# Patient Record
Sex: FEMALE | Race: OTHER | HISPANIC OR LATINO | URBAN - METROPOLITAN AREA
[De-identification: names, ages, dates, MRNs, and addresses within clinical notes are randomized per-mention and may not be internally consistent; named-entity substitution may affect disease eponyms.]

---

## 2017-07-23 ENCOUNTER — EMERGENCY (EMERGENCY)
Facility: HOSPITAL | Age: 57
LOS: 1 days | Discharge: HOME | End: 2017-07-23
Attending: EMERGENCY MEDICINE | Admitting: EMERGENCY MEDICINE
Payer: MEDICAID

## 2017-07-23 VITALS
HEART RATE: 95 BPM | SYSTOLIC BLOOD PRESSURE: 170 MMHG | RESPIRATION RATE: 20 BRPM | OXYGEN SATURATION: 100 % | DIASTOLIC BLOOD PRESSURE: 109 MMHG | TEMPERATURE: 98 F

## 2017-07-23 LAB
ALBUMIN SERPL ELPH-MCNC: 4.1 G/DL — SIGNIFICANT CHANGE UP (ref 3.3–5)
ALP SERPL-CCNC: 70 U/L — SIGNIFICANT CHANGE UP (ref 40–120)
ALT FLD-CCNC: 84 U/L RC — HIGH (ref 10–45)
ANION GAP SERPL CALC-SCNC: 15 MMOL/L — SIGNIFICANT CHANGE UP (ref 5–17)
AST SERPL-CCNC: 51 U/L — HIGH (ref 10–40)
BASOPHILS # BLD AUTO: 0.1 K/UL — SIGNIFICANT CHANGE UP (ref 0–0.2)
BASOPHILS NFR BLD AUTO: 1 % — SIGNIFICANT CHANGE UP (ref 0–2)
BILIRUB SERPL-MCNC: 0.2 MG/DL — SIGNIFICANT CHANGE UP (ref 0.2–1.2)
BUN SERPL-MCNC: 16 MG/DL — SIGNIFICANT CHANGE UP (ref 7–23)
CALCIUM SERPL-MCNC: 9.2 MG/DL — SIGNIFICANT CHANGE UP (ref 8.4–10.5)
CHLORIDE SERPL-SCNC: 103 MMOL/L — SIGNIFICANT CHANGE UP (ref 96–108)
CK MB CFR SERPL CALC: 2.4 NG/ML — SIGNIFICANT CHANGE UP (ref 0–3.8)
CK SERPL-CCNC: 96 U/L — SIGNIFICANT CHANGE UP (ref 25–170)
CO2 SERPL-SCNC: 23 MMOL/L — SIGNIFICANT CHANGE UP (ref 22–31)
CREAT SERPL-MCNC: 0.77 MG/DL — SIGNIFICANT CHANGE UP (ref 0.5–1.3)
EOSINOPHIL # BLD AUTO: 0.2 K/UL — SIGNIFICANT CHANGE UP (ref 0–0.5)
EOSINOPHIL NFR BLD AUTO: 3.2 % — SIGNIFICANT CHANGE UP (ref 0–6)
GAS PNL BLDV: SIGNIFICANT CHANGE UP
GLUCOSE SERPL-MCNC: 159 MG/DL — HIGH (ref 70–99)
HCT VFR BLD CALC: 41.2 % — SIGNIFICANT CHANGE UP (ref 34.5–45)
HGB BLD-MCNC: 14.1 G/DL — SIGNIFICANT CHANGE UP (ref 11.5–15.5)
LYMPHOCYTES # BLD AUTO: 2.4 K/UL — SIGNIFICANT CHANGE UP (ref 1–3.3)
LYMPHOCYTES # BLD AUTO: 40.4 % — SIGNIFICANT CHANGE UP (ref 13–44)
MCHC RBC-ENTMCNC: 30.6 PG — SIGNIFICANT CHANGE UP (ref 27–34)
MCHC RBC-ENTMCNC: 34.2 GM/DL — SIGNIFICANT CHANGE UP (ref 32–36)
MCV RBC AUTO: 89.5 FL — SIGNIFICANT CHANGE UP (ref 80–100)
MONOCYTES # BLD AUTO: 0.5 K/UL — SIGNIFICANT CHANGE UP (ref 0–0.9)
MONOCYTES NFR BLD AUTO: 8.5 % — SIGNIFICANT CHANGE UP (ref 2–14)
NEUTROPHILS # BLD AUTO: 2.8 K/UL — SIGNIFICANT CHANGE UP (ref 1.8–7.4)
NEUTROPHILS NFR BLD AUTO: 46.8 % — SIGNIFICANT CHANGE UP (ref 43–77)
PLATELET # BLD AUTO: 156 K/UL — SIGNIFICANT CHANGE UP (ref 150–400)
POTASSIUM SERPL-MCNC: 4.3 MMOL/L — SIGNIFICANT CHANGE UP (ref 3.5–5.3)
POTASSIUM SERPL-SCNC: 4.3 MMOL/L — SIGNIFICANT CHANGE UP (ref 3.5–5.3)
PROT SERPL-MCNC: 7.4 G/DL — SIGNIFICANT CHANGE UP (ref 6–8.3)
RBC # BLD: 4.61 M/UL — SIGNIFICANT CHANGE UP (ref 3.8–5.2)
RBC # FLD: 12.2 % — SIGNIFICANT CHANGE UP (ref 10.3–14.5)
SODIUM SERPL-SCNC: 141 MMOL/L — SIGNIFICANT CHANGE UP (ref 135–145)
TROPONIN T SERPL-MCNC: <0.01 NG/ML — SIGNIFICANT CHANGE UP (ref 0–0.06)
WBC # BLD: 5.9 K/UL — SIGNIFICANT CHANGE UP (ref 3.8–10.5)
WBC # FLD AUTO: 5.9 K/UL — SIGNIFICANT CHANGE UP (ref 3.8–10.5)

## 2017-07-23 PROCEDURE — 99220: CPT | Mod: 25

## 2017-07-23 PROCEDURE — 73090 X-RAY EXAM OF FOREARM: CPT | Mod: 26,LT

## 2017-07-23 PROCEDURE — 73110 X-RAY EXAM OF WRIST: CPT | Mod: 26,LT

## 2017-07-23 PROCEDURE — 93010 ELECTROCARDIOGRAM REPORT: CPT

## 2017-07-23 PROCEDURE — 71010: CPT | Mod: 26

## 2017-07-23 PROCEDURE — 73502 X-RAY EXAM HIP UNI 2-3 VIEWS: CPT | Mod: 26,RT

## 2017-07-23 RX ORDER — SODIUM CHLORIDE 9 MG/ML
3 INJECTION INTRAMUSCULAR; INTRAVENOUS; SUBCUTANEOUS EVERY 8 HOURS
Qty: 0 | Refills: 0 | Status: DISCONTINUED | OUTPATIENT
Start: 2017-07-23 | End: 2017-07-27

## 2017-07-23 RX ORDER — MORPHINE SULFATE 50 MG/1
4 CAPSULE, EXTENDED RELEASE ORAL ONCE
Qty: 0 | Refills: 0 | Status: DISCONTINUED | OUTPATIENT
Start: 2017-07-23 | End: 2017-07-23

## 2017-07-23 RX ADMIN — MORPHINE SULFATE 4 MILLIGRAM(S): 50 CAPSULE, EXTENDED RELEASE ORAL at 21:06

## 2017-07-23 RX ADMIN — MORPHINE SULFATE 4 MILLIGRAM(S): 50 CAPSULE, EXTENDED RELEASE ORAL at 22:01

## 2017-07-23 NOTE — ED PROVIDER NOTE - OBJECTIVE STATEMENT
57F PMH hypothyroidism p/w syncope. Was previously well, walking down street when she suddenly became lightheaded and collapsed. She was able to lower herself to her buttocks. No head trauma, no LOC, no postictal state. No diaphoresis, CP, SOB, N/V. C/o L wrist, L shoulder, R hip, L back pain. No cardiac history.

## 2017-07-23 NOTE — ED CDU PROVIDER NOTE - OBJECTIVE STATEMENT
57F PMH hypothyroidism p/w syncope. Was previously well, walking down street when she suddenly became lightheaded and collapsed. She was able to lower herself to her buttocks. No head trauma,  no postictal state. No diaphoresis, CP, SOB, N/V. C/o L wrist, L shoulder, R hip, L back pain. No cardiac history.    In the ED VSS.  trop negative x 1.  EKG NSR.  Patient denies CP/SOB, head trauma.  Only c/o left wrist pain and back pain.

## 2017-07-23 NOTE — ED ADULT NURSE NOTE - OBJECTIVE STATEMENT
56 y/o female presenting to the ED complaining of left arm pain s/p syncope; Patient states was walking on sidewalk when had a witnessed LOC; Per family patient lowered herself to the ground; No head trauma; Patient fell on left arm; Positive deformity noted to left wrist; Patient able to move fingers; positive sensation and pulses; Patient denies chest pain, dizziness, fevers, n/v/d, SOB; Pain in left arm noted to patient; a&ox3; safety and comfort measures proved; family at bedside; bed placed in lowest position for safety

## 2017-07-23 NOTE — ED CDU PROVIDER NOTE - PLAN OF CARE
1.  Stay Hydrated  2. Continue taking all current home medications  3.  Please follow up with your Primary care provider in 1-2 (Please bring all of your results with you)  4.  Return to the ER for worsening Chest Pain, Shortness of breath or any other concerning symptoms. 1.  Stay Hydrated  2. Continue taking all current home medications  3.  Please follow up with your Primary care provider in 1-2 (Please bring all of your results with you)       Follow up with orthopedics as instructed  4.  Return to the ER for worsening Chest Pain, Shortness of breath or any other concerning symptoms. 1.  Stay Hydrated  2. Continue taking all current home medications  3.  Please follow up with your Primary care provider in 1-2 (Please bring all of your results with you)       Follow up with orthopedics as instructed - Dr. Casillas [phone].  4.  Return to the ER for worsening Chest Pain, Shortness of breath or any other concerning symptoms. 1. Stay hydrated  2. Continue taking all current home medications  3. Please follow up with your Primary care provider in 1-2. You will need further evaluation of thyroid function tests. (Please bring all of your results with you).    Follow up with orthopedic as instructed - Dr. Casillas at [phone]. Call to make an appointment this week.   4.  Return to the ER for worsening chest pain, shortness of breath, worsening pain, numbness or tingling in hand/fingers, or any other concerning symptoms. 1. Stay hydrated  2. Continue taking all current home medications. Because you ran out of your Synthroid- will give you prescription for 1 week, but please have followed up with your Doctor. Take Ibuprofen 600mg every 6hrs for pain as needed with food. Take Percocet 1 tab every 6hrs for severe pain as needed - don't drive after, makes drowsy.   3. Please follow up with your Primary care provider in 1-2. You will need further evaluation of thyroid function tests and you will need to be sent for a stress test by your Doctor or by Cardiology clinic [phone], follow up in 1-2 days. (Please bring all of your results with you).    Follow up with orthopedic as instructed - Dr. Casillas at [phone], in 3-5 days. Call to make an appointment this week.   4.  Return to the ER for worsening chest pain, shortness of breath, worsening pain, numbness or tingling in hand/fingers, or any other concerning symptoms.

## 2017-07-23 NOTE — ED PROVIDER NOTE - ATTENDING CONTRIBUTION TO CARE
Dr. Joy Note: pt with exertional syncope.  Also with L forearm contusion after fall and R posterior lower back/hip sprain.  Last stress 2yrs ago.

## 2017-07-23 NOTE — ED CDU PROVIDER NOTE - ATTENDING CONTRIBUTION TO CARE
I have personally performed a face to face diagnostic evaluation on this patient.  I have reviewed the ACP note and agree with the history, exam, and plan of care, except as noted.  History and Exam by me shows as dictated in ED Provider Note.

## 2017-07-23 NOTE — ED PROVIDER NOTE - MEDICAL DECISION MAKING DETAILS
57F PMH hypothyroidism p/w exertional syncope. No cardiac history. Xrays of wrist 57F PMH hypothyroidism p/w exertional syncope. No cardiac history. Xrays of wrist, forearm, shoulder, pelvis. Check basic labs. EKG WNL except borderline prolonged QTc. Monitor on tele. Given age and exertional syncope will check stress echo. CDU.

## 2017-07-23 NOTE — ED ADULT TRIAGE NOTE - CHIEF COMPLAINT QUOTE
syncope.    Pt was dizzy and fell.  Fall was witnessed.    Pt complaining of left arm pain and back pain.   Pt did not hit head.

## 2017-07-23 NOTE — ED ADULT NURSE NOTE - ED STAT RN HANDOFF DETAILS
Report given to CDU FRANKY ANDINO. Pt awaiting X-ray, X-ray reads and then moving to CDU as per SYDNEY Lopez CDU FRANKY ANIDNO made aware in verbal report. Pt on portable CCM with NSR HR 72. Pt and family aware of plan of care. Safety maintained. Family at bedside. Report given to CDU FRANKY ANDINO. Pt awaiting X-ray, X-ray reads and then moving to CDU as per SYDNEY Lopez, CDU FRANKY ANDINO made aware in verbal report. Pt on portable CCM with NSR HR 72. Pt and family aware of plan of care. Safety maintained. Family at bedside.    0000: Splint in place to L forearm, Pt has pos and equal sensation to upper extremities bilat, pt able to wiggle L fingers, strong peripheral pulses x 4, no numbness/tingling to L hand/fingers. CDU FRANKY ANDINO made aware that splint is in place, EDT taking pt to CDU bed 7 in wheelchair, family at bedside.

## 2017-07-23 NOTE — ED CDU PROVIDER NOTE - PHYSICAL EXAMINATION
MSK: left wrist TTP, no snuff box ttp.  right lumbosacral paraspinal TTP and right hi TTP.  FROM in all extremities.  Compartments soft

## 2017-07-23 NOTE — ED CDU PROVIDER NOTE - PROGRESS NOTE DETAILS
Patient seen at bedside in NAD.  VSS.  Patient resting comfortably without complaints. Patient seen by ortho at bedside.  Placed in a long arm sling. -John Garibay PA-C Patient seen at bedside in NAD.  VSS.  Patient resting comfortably without complaints. -John Garibay PA-C Patient seen and evaluated at bedside. Resting comfortably, NAD. C/o L wrist pain. Denies CP, palpitations, SOB. VSS. Pt in L arm splint to f/u with ortho as outpatient. Awaiting stress test. Informed patient of elevated TSH and recommended f/u with PCP. Will send T3 and T4. - Camelia Vazquez PA-C Patient resting in bed comfortably. NAD.  No complaints. Vital Signs Stable. No events on telemetry monitor.  -Camelia Vazquez PA-C CDU NOTE SYDNEY Doyle: pt resting comfortably, feels well without complaint. NAD VSS. no events on tele. L arm in splint. pt had Echo portion of testing. pt unable to get stress portion due to fracture. as per discussion with patient, pt does not want to stay til tomorrow for stress test and wants to go home. pt reports that she will f/up with her PCP for stress test. pt never had CP, sob/dyspnea, weakness, jaw pain, diaphoresis, paresthesias. discussed with Dr. Castillo grayson for patient to f/up outpt. CDU NOTE SYDNEY Doyle: pt resting comfortably, feels well without complaint. NAD VSS. no events on tele. L arm in splint & elevated. pt had Echo portion of testing. pt unable to get stress portion due to fracture. as per discussion with patient, pt does not want to stay til tomorrow for stress test offered and wants to go home. pt reports that she will f/up with her PCP for stress test, and should have no problem seeing him tomorrow at his office. pt never had CP, sob/dyspnea, weakness, jaw pain, diaphoresis, paresthesias. discussed with Dr. Castillo grayson for patient to f/up outpt. CDU NOTE SYDNEY Doyle: pt resting comfortably, feels well without complaint. NAD VSS. no events on tele. CDU Attending Progress Note: Patient resting comfortably. Echo wnl. Stress not able to be completed today. Patient does not want to stay another night for stress test.  Risks, benefits , alternatives discussed with patient.  Patient declines the test is is aware of the potential consequences. Patient will follow up with their primary physician.  Patient understands to f/u.  will d/c home with instructions to f/u with PMD, cardiologist and ortho. Patient also has been off her synthroid for 6 months. will give additional one week supply. Patient understands importance of following up.  Stable for discharge. - Dr. Velasquez

## 2017-07-24 VITALS
DIASTOLIC BLOOD PRESSURE: 82 MMHG | TEMPERATURE: 98 F | RESPIRATION RATE: 16 BRPM | OXYGEN SATURATION: 95 % | SYSTOLIC BLOOD PRESSURE: 140 MMHG | HEART RATE: 64 BPM

## 2017-07-24 LAB
APPEARANCE UR: ABNORMAL
BACTERIA # UR AUTO: ABNORMAL /HPF
BILIRUB UR-MCNC: NEGATIVE — SIGNIFICANT CHANGE UP
CHOLEST SERPL-MCNC: 217 MG/DL — HIGH (ref 10–199)
COLOR SPEC: YELLOW — SIGNIFICANT CHANGE UP
COMMENT - URINE: SIGNIFICANT CHANGE UP
DIFF PNL FLD: NEGATIVE — SIGNIFICANT CHANGE UP
EPI CELLS # UR: SIGNIFICANT CHANGE UP /HPF
GLUCOSE UR QL: ABNORMAL
HBA1C BLD-MCNC: 5.9 % — HIGH (ref 4–5.6)
HDLC SERPL-MCNC: 38 MG/DL — LOW (ref 40–125)
KETONES UR-MCNC: NEGATIVE — SIGNIFICANT CHANGE UP
LEUKOCYTE ESTERASE UR-ACNC: ABNORMAL
LIPID PNL WITH DIRECT LDL SERPL: 131 MG/DL — HIGH
NITRITE UR-MCNC: NEGATIVE — SIGNIFICANT CHANGE UP
PH UR: 6 — SIGNIFICANT CHANGE UP (ref 5–8)
PROT UR-MCNC: 30 MG/DL
RBC CASTS # UR COMP ASSIST: SIGNIFICANT CHANGE UP /HPF (ref 0–2)
SP GR SPEC: >1.03 — HIGH (ref 1.01–1.02)
T3 SERPL-MCNC: 111 NG/DL — SIGNIFICANT CHANGE UP (ref 80–200)
T4 AB SER-ACNC: 8 UG/DL — SIGNIFICANT CHANGE UP (ref 4.6–12)
TOTAL CHOLESTEROL/HDL RATIO MEASUREMENT: 5.7 RATIO — SIGNIFICANT CHANGE UP (ref 3.3–7.1)
TRIGL SERPL-MCNC: 239 MG/DL — HIGH (ref 10–149)
TROPONIN T SERPL-MCNC: <0.01 NG/ML — SIGNIFICANT CHANGE UP (ref 0–0.06)
TSH SERPL-MCNC: 9.18 UIU/ML — HIGH (ref 0.27–4.2)
UROBILINOGEN FLD QL: NEGATIVE — SIGNIFICANT CHANGE UP
WBC UR QL: SIGNIFICANT CHANGE UP /HPF (ref 0–5)

## 2017-07-24 PROCEDURE — 93010 ELECTROCARDIOGRAM REPORT: CPT | Mod: 77

## 2017-07-24 PROCEDURE — 73110 X-RAY EXAM OF WRIST: CPT

## 2017-07-24 PROCEDURE — 96374 THER/PROPH/DIAG INJ IV PUSH: CPT | Mod: XU

## 2017-07-24 PROCEDURE — 84443 ASSAY THYROID STIM HORMONE: CPT

## 2017-07-24 PROCEDURE — 93005 ELECTROCARDIOGRAM TRACING: CPT | Mod: XU

## 2017-07-24 PROCEDURE — 73110 X-RAY EXAM OF WRIST: CPT | Mod: 26,LT

## 2017-07-24 PROCEDURE — G0378: CPT

## 2017-07-24 PROCEDURE — 93306 TTE W/DOPPLER COMPLETE: CPT | Mod: 26

## 2017-07-24 PROCEDURE — 84480 ASSAY TRIIODOTHYRONINE (T3): CPT

## 2017-07-24 PROCEDURE — 85027 COMPLETE CBC AUTOMATED: CPT

## 2017-07-24 PROCEDURE — 73090 X-RAY EXAM OF FOREARM: CPT

## 2017-07-24 PROCEDURE — 96375 TX/PRO/DX INJ NEW DRUG ADDON: CPT | Mod: XU

## 2017-07-24 PROCEDURE — 71045 X-RAY EXAM CHEST 1 VIEW: CPT

## 2017-07-24 PROCEDURE — 84484 ASSAY OF TROPONIN QUANT: CPT

## 2017-07-24 PROCEDURE — 93306 TTE W/DOPPLER COMPLETE: CPT

## 2017-07-24 PROCEDURE — 84436 ASSAY OF TOTAL THYROXINE: CPT

## 2017-07-24 PROCEDURE — 96376 TX/PRO/DX INJ SAME DRUG ADON: CPT | Mod: XU

## 2017-07-24 PROCEDURE — 80061 LIPID PANEL: CPT

## 2017-07-24 PROCEDURE — 80053 COMPREHEN METABOLIC PANEL: CPT

## 2017-07-24 PROCEDURE — 83036 HEMOGLOBIN GLYCOSYLATED A1C: CPT

## 2017-07-24 PROCEDURE — 82962 GLUCOSE BLOOD TEST: CPT

## 2017-07-24 PROCEDURE — 81001 URINALYSIS AUTO W/SCOPE: CPT

## 2017-07-24 PROCEDURE — 73502 X-RAY EXAM HIP UNI 2-3 VIEWS: CPT

## 2017-07-24 PROCEDURE — 82553 CREATINE MB FRACTION: CPT

## 2017-07-24 PROCEDURE — 99217: CPT

## 2017-07-24 PROCEDURE — 82550 ASSAY OF CK (CPK): CPT

## 2017-07-24 PROCEDURE — 99285 EMERGENCY DEPT VISIT HI MDM: CPT | Mod: 25

## 2017-07-24 RX ORDER — KETOROLAC TROMETHAMINE 30 MG/ML
30 SYRINGE (ML) INJECTION EVERY 6 HOURS
Qty: 0 | Refills: 0 | Status: DISCONTINUED | OUTPATIENT
Start: 2017-07-24 | End: 2017-07-24

## 2017-07-24 RX ORDER — IBUPROFEN 200 MG
1 TABLET ORAL
Qty: 16 | Refills: 0 | OUTPATIENT
Start: 2017-07-24 | End: 2017-07-28

## 2017-07-24 RX ORDER — ACETAMINOPHEN 500 MG
975 TABLET ORAL ONCE
Qty: 0 | Refills: 0 | Status: COMPLETED | OUTPATIENT
Start: 2017-07-24 | End: 2017-07-24

## 2017-07-24 RX ADMIN — SODIUM CHLORIDE 3 MILLILITER(S): 9 INJECTION INTRAMUSCULAR; INTRAVENOUS; SUBCUTANEOUS at 06:19

## 2017-07-24 RX ADMIN — Medication 30 MILLIGRAM(S): at 20:31

## 2017-07-24 RX ADMIN — Medication 30 MILLIGRAM(S): at 00:26

## 2017-07-24 RX ADMIN — Medication 30 MILLIGRAM(S): at 08:45

## 2017-07-24 RX ADMIN — Medication 975 MILLIGRAM(S): at 18:31

## 2017-07-24 RX ADMIN — SODIUM CHLORIDE 3 MILLILITER(S): 9 INJECTION INTRAMUSCULAR; INTRAVENOUS; SUBCUTANEOUS at 14:28

## 2017-07-24 RX ADMIN — Medication 30 MILLIGRAM(S): at 00:50

## 2017-07-24 NOTE — ED ADULT NURSE REASSESSMENT NOTE - NS ED NURSE REASSESS COMMENT FT1
Pt d/c from cdu as per provider. Pt d/c instructions provided by Kassy GRIMES. Pt VSS, afebrile, IV catheter removed. Pt free from harm, leaving unit with family. Pt left by private car, safety maintained.
taken report from Nicolette WILKINS
Received pt from FRANKY Park , received pt alert and responsive, oriented x4, denies any respiratory distress, SOB, or difficulty breathing. Pt transferred to CDU for observation for syncopal episode, pt denies CP, SOB, pt c/o pain to L arm, will medicate as ordered. IV in place, patent and free of signs of infiltration, placed on continuous cardiac monitoring as ordered, NSR HR in the 80's,  pt denies chest pain or palpitations, V/S stable, pt afebrile, pt pending stress echo in AM, pt aware and 2nd CE with EKG at 0300. Pt educated on unit and unit rules, instructed patient to notify RN of any needed assistance, Pt verbalizes understanding, Call bell placed within reach. Safety maintained. Will continue to monitor.

## 2017-07-24 NOTE — CHART NOTE - NSCHARTNOTEFT_GEN_A_CORE
HPI:   This is a 57yFemale in the CDU for an evaluation of exertional syncope. She had a syncopal fall today and landed on outstretched L wrist. Otherwise well in usual state of health. Immediate pain/swelling. No numbness/tingling.     Review of systems: Denies fever, chills, nausea, vomiting, recent infection, previous fractures.    Medications: MEDICATIONS  (STANDING):  sodium chloride 0.9% lock flush 3 milliLiter(s) IV Push every 8 hours    MEDICATIONS  (PRN):  ketorolac   Injectable 30 milliGRAM(s) IV Push every 6 hours PRN Moderate Pain (4 - 6)    T(C): 36.8 (07-24-17 @ 00:20), Max: 36.8 (07-23-17 @ 19:57)  HR: 83 (07-24-17 @ 00:20) (72 - 95)  BP: 146/79 (07-24-17 @ 00:20) (142/77 - 170/109)  RR: 17 (07-24-17 @ 00:20) (16 - 20)  SpO2: 95% (07-24-17 @ 00:20) (95% - 100%)  Wt(kg): --                          14.1   5.9   )-----------( 156      ( 23 Jul 2017 21:05 )             41.2     07-23    141  |  103  |  16  ----------------------------<  159<H>  4.3   |  23  |  0.77    Ca    9.2      23 Jul 2017 21:05    TPro  7.4  /  Alb  4.1  /  TBili  0.2  /  DBili  x   /  AST  51<H>  /  ALT  84<H>  /  AlkPhos  70  07-23    XR L wrist: non-displaced, communited intraarticular distal radius, and ulnar styloid fractures    EXAM:  Awake, Alert and in no acute distress  Pleasant and cooperative.  - LUE: In splint - removed; skin intact; TTP about wrist; compartments soft; SILT VSF/VIF/FDWS; TUP/X/AOK intact; RP 2+    Arm was hung, fracture reduced, and sugartong applied w/XR showing good splint    A/P: This is a 57y Female who presents today with L distal radius fracture    - Pain control  - RACHAEL STODDARD in splint/sling  - Keep splint dry/elevated  - FU w/Dr Casillas as outpatient - [phone]

## 2017-08-02 ENCOUNTER — APPOINTMENT (OUTPATIENT)
Dept: ORTHOPEDIC SURGERY | Facility: CLINIC | Age: 57
End: 2017-08-02

## 2019-03-06 ENCOUNTER — OUTPATIENT (OUTPATIENT)
Dept: OUTPATIENT SERVICES | Facility: HOSPITAL | Age: 59
LOS: 1 days | End: 2019-03-06
Payer: SELF-PAY

## 2019-03-06 ENCOUNTER — APPOINTMENT (OUTPATIENT)
Dept: INTERNAL MEDICINE | Facility: CLINIC | Age: 59
End: 2019-03-06

## 2019-03-06 VITALS
HEIGHT: 57.5 IN | DIASTOLIC BLOOD PRESSURE: 80 MMHG | BODY MASS INDEX: 31.07 KG/M2 | WEIGHT: 146 LBS | SYSTOLIC BLOOD PRESSURE: 120 MMHG

## 2019-03-06 DIAGNOSIS — R50.9 FEVER, UNSPECIFIED: ICD-10-CM

## 2019-03-06 DIAGNOSIS — I10 ESSENTIAL (PRIMARY) HYPERTENSION: ICD-10-CM

## 2019-03-06 DIAGNOSIS — R05 COUGH: ICD-10-CM

## 2019-03-06 DIAGNOSIS — Z00.00 ENCOUNTER FOR GENERAL ADULT MEDICAL EXAMINATION W/OUT ABNORMAL FINDINGS: ICD-10-CM

## 2019-03-06 PROCEDURE — G0463: CPT

## 2019-03-07 LAB
BILIRUB UR QL STRIP: NORMAL
CLARITY UR: NORMAL
COLLECTION METHOD: NORMAL
GLUCOSE UR-MCNC: NORMAL
HCG UR QL: 0.2 EU/DL
HGB UR QL STRIP.AUTO: NORMAL
KETONES UR-MCNC: NORMAL
LEUKOCYTE ESTERASE UR QL STRIP: NORMAL
NITRITE UR QL STRIP: NORMAL
PH UR STRIP: 5.5
PROT UR STRIP-MCNC: 30
SP GR UR STRIP: >1.03

## 2019-03-08 LAB
ALBUMIN SERPL ELPH-MCNC: 4.8 G/DL
ALP BLD-CCNC: 91 U/L
ALT SERPL-CCNC: 45 U/L
ANION GAP SERPL CALC-SCNC: 18 MMOL/L
AST SERPL-CCNC: 35 U/L
BASOPHILS # BLD AUTO: 0.04 K/UL
BASOPHILS NFR BLD AUTO: 0.7 %
BILIRUB SERPL-MCNC: 0.3 MG/DL
BUN SERPL-MCNC: 14 MG/DL
CALCIUM SERPL-MCNC: 10 MG/DL
CHLORIDE SERPL-SCNC: 103 MMOL/L
CHOLEST SERPL-MCNC: 218 MG/DL
CHOLEST/HDLC SERPL: 4.3 RATIO
CO2 SERPL-SCNC: 23 MMOL/L
CREAT SERPL-MCNC: 0.55 MG/DL
EOSINOPHIL # BLD AUTO: 0.17 K/UL
EOSINOPHIL NFR BLD AUTO: 3 %
GLUCOSE SERPL-MCNC: 98 MG/DL
HBA1C MFR BLD HPLC: 6 %
HCT VFR BLD CALC: 46.1 %
HDLC SERPL-MCNC: 51 MG/DL
HGB BLD-MCNC: 14.6 G/DL
IMM GRANULOCYTES NFR BLD AUTO: 0.2 %
LDLC SERPL CALC-MCNC: 128 MG/DL
LYMPHOCYTES # BLD AUTO: 2.36 K/UL
LYMPHOCYTES NFR BLD AUTO: 41.7 %
MAN DIFF?: NORMAL
MCHC RBC-ENTMCNC: 28.2 PG
MCHC RBC-ENTMCNC: 31.7 GM/DL
MCV RBC AUTO: 89 FL
MONOCYTES # BLD AUTO: 0.34 K/UL
MONOCYTES NFR BLD AUTO: 6 %
NEUTROPHILS # BLD AUTO: 2.74 K/UL
NEUTROPHILS NFR BLD AUTO: 48.4 %
PLATELET # BLD AUTO: 270 K/UL
POTASSIUM SERPL-SCNC: 4.9 MMOL/L
PROT SERPL-MCNC: 7.9 G/DL
RBC # BLD: 5.18 M/UL
RBC # FLD: 14.6 %
SODIUM SERPL-SCNC: 144 MMOL/L
TRIGL SERPL-MCNC: 194 MG/DL
TSH SERPL-ACNC: 2.68 UIU/ML
WBC # FLD AUTO: 5.66 K/UL

## 2019-03-18 NOTE — ASSESSMENT
[FreeTextEntry1] : 58 yo F PMHx hypothyroidism, anxiety, depression, insomnia presenting for acute visit (fever, back pain).  requests to translate. \par \par # Viral URI: Appears to have persistent cough likely from recent URI. \par - Sent script for tessalon perles for symptom management \par \par # Hypothyroidism: Requesting refill\par - Sending TSH, if elevated may be in setting of recent illness and will need repeat when feeling better \par \par # HCM:\par - Plans to reestablish care here\par - Requested patient bring outpatient records\par - WIll need scripts (listed as recorded, will need to be reordered)\par - Lipid panel/HCM labs per request of patient, CPE in 5 weeks\par - Will need to readdress zolpidem in next visit \par \par D/w Dr. Cool\par - CPE in 5 weeks, will need scripts resent to a pharmacy (will need confirmation of a pharmacy, since currently listed as recorded)

## 2019-03-18 NOTE — REVIEW OF SYSTEMS
[FreeTextEntry2] : Please see HPI [FreeTextEntry5] : Please see HPI [FreeTextEntry6] : Please see HPI [FreeTextEntry7] : No diarrhea [FreeTextEntry8] : Please see HPI [de-identified] : Please see HPI

## 2019-03-18 NOTE — PHYSICAL EXAM
[No Acute Distress] : no acute distress [Well Nourished] : well nourished [Well Developed] : well developed [Well-Appearing] : well-appearing [No Respiratory Distress] : no respiratory distress  [Clear to Auscultation] : lungs were clear to auscultation bilaterally [No Accessory Muscle Use] : no accessory muscle use [Normal Rate] : normal rate  [Regular Rhythm] : with a regular rhythm [Normal S1, S2] : normal S1 and S2 [No Murmur] : no murmur heard [Soft] : abdomen soft [Non Tender] : non-tender [Non-distended] : non-distended [Normal Affect] : the affect was normal [Normal Mood] : the mood was normal [de-identified] : Mild cough nonproductive

## 2019-03-18 NOTE — HISTORY OF PRESENT ILLNESS
[FreeTextEntry8] : 60 yo F PMHx hypothyroidism, anxiety, depression, insomnia presenting for acute visit (fever, back pain).  requests to translate. \par \par 10 days ago symptoms had started with sore throat and difficulty swallowing, followed by fevers. Her last fever was two days ago. Her  previously had symptoms as well. She now has had persistent cough. She has not had checked her temperature. This cough has lasted for 10 days. She has noticed a little bit of phleghm without hemoptysis. No change in vision or dry eyes, no drip from nose. Had previous sore throat. No trouble with swallowing. Has had pain in the back with coughing. No burning with urination. No hematuria. No diarrhea. Has taken claritin and tylenol for last 3-4 days. No changes in weight. Some pain in the upper chest with cough. No palpitations. A little short of breath. \par \par She has history of depression/anxiety which she feels is well controlled. She wants to check her cholesterol today.

## 2019-03-19 DIAGNOSIS — R05 COUGH: ICD-10-CM

## 2019-03-19 DIAGNOSIS — R50.9 FEVER, UNSPECIFIED: ICD-10-CM

## 2019-04-09 ENCOUNTER — APPOINTMENT (OUTPATIENT)
Dept: INTERNAL MEDICINE | Facility: CLINIC | Age: 59
End: 2019-04-09

## 2019-04-09 ENCOUNTER — OUTPATIENT (OUTPATIENT)
Dept: OUTPATIENT SERVICES | Facility: HOSPITAL | Age: 59
LOS: 1 days | End: 2019-04-09
Payer: SELF-PAY

## 2019-04-09 VITALS
HEIGHT: 57.5 IN | WEIGHT: 148 LBS | BODY MASS INDEX: 31.49 KG/M2 | DIASTOLIC BLOOD PRESSURE: 80 MMHG | SYSTOLIC BLOOD PRESSURE: 110 MMHG

## 2019-04-09 DIAGNOSIS — F41.9 ANXIETY DISORDER, UNSPECIFIED: ICD-10-CM

## 2019-04-09 DIAGNOSIS — R73.03 PREDIABETES.: ICD-10-CM

## 2019-04-09 DIAGNOSIS — I10 ESSENTIAL (PRIMARY) HYPERTENSION: ICD-10-CM

## 2019-04-09 DIAGNOSIS — F32.9 MAJOR DEPRESSIVE DISORDER, SINGLE EPISODE, UNSPECIFIED: ICD-10-CM

## 2019-04-09 DIAGNOSIS — G47.00 INSOMNIA, UNSPECIFIED: ICD-10-CM

## 2019-04-09 PROCEDURE — G0463: CPT

## 2019-04-09 RX ORDER — BENZONATATE 100 MG/1
100 CAPSULE ORAL
Qty: 42 | Refills: 0 | Status: DISCONTINUED | COMMUNITY
Start: 2019-03-06 | End: 2019-04-09

## 2019-04-09 RX ORDER — LEVOTHYROXINE SODIUM 0.1 MG/1
100 TABLET ORAL
Qty: 90 | Refills: 1 | Status: ACTIVE | COMMUNITY
Start: 2019-03-06 | End: 1900-01-01

## 2019-04-09 NOTE — PHYSICAL EXAM
[No Acute Distress] : no acute distress [Well Nourished] : well nourished [Well Developed] : well developed [Well-Appearing] : well-appearing [No Respiratory Distress] : no respiratory distress  [Normal Rate] : normal rate  [No Accessory Muscle Use] : no accessory muscle use [Clear to Auscultation] : lungs were clear to auscultation bilaterally [No Edema] : there was no peripheral edema [Normal S1, S2] : normal S1 and S2 [Regular Rhythm] : with a regular rhythm [Soft] : abdomen soft [Non Tender] : non-tender [Non-distended] : non-distended [No Masses] : no abdominal mass palpated [No Rash] : no rash

## 2019-04-10 NOTE — HISTORY OF PRESENT ILLNESS
[de-identified] : 60 yo F PMHx hypothyroidism, anxiety, depression, insomnia presenting for follow-up. [place]  ID: [id number]\par \par During last visit: \par - Decided to establish care at [facility] clinic\par - Requested refills for medications\par \par Feels better today. No complaints. Currently exercises an hour to an hour and a half at the gym. Does a combination of different machines. Asking for script for zolpidem and clonazepam for anxiety (only for emergencies). Not on this often. Clonazepam 0.5 mg\par Did not bring outside records. Last time had mammogram and gyn - 1 year ago, was told everything was normal. \par \par ROS: \par No fevers, chills or sweats\par No palpitations, chest pain\par Has shortness of breath when when it is very hot or humid feels suffocated\par - Takes the clonazepam and it helps \par No abdominal pain or diarrhea

## 2019-04-10 NOTE — REVIEW OF SYSTEMS
[Fever] : no fever [Chills] : no chills [Chest Pain] : no chest pain [Palpitations] : no palpitations [Abdominal Pain] : no abdominal pain [Diarrhea] : diarrhea [FreeTextEntry6] : Occasional shortness of breath when humid outside [de-identified] : Reports controlled depression

## 2019-04-10 NOTE — PLAN
[FreeTextEntry1] : 60 yo F PMHx hypothyroidism, anxiety, depression, insomnia presenting for follow-up. [place]  ID: [id number]\par \par # Insomnia: Patient reports difficulty falling asleep. Requests renewal of zolpidem \par - Sleep study referral for possible sleep apnea\par - Discussed risks of benzodiazepines, including sedation. Counseled against drinking alcohol. \par - ISTOP: [id number]. Zolpidem last written script 11/03/2018, dispensed on 3/31/2019, 30 tablets of zolpidem 10 mg by Dr. Renae Salinas\par - Starting taper (15 tablets for this next month ahead). Return to clinic in 5 weeks for next part of taper\par - Discussed sleep hygiene, including melatonin, tea, consistent sleep schedule/sleep hygiene. \par \par # Obesity/pre-diabetes\par - Discussed lab results\par - Encouraged weight loss and diet changes\par - Provided referral for nutritionist\par \par # Anxiety and Depression:\par - Reports controlled symptoms\par - Patient requesting clonazepam for occasional symptoms of anxiety, however given patient is on zolpidem not recommended at this time. \par - Provided referral to psychiatry for treatment of anxiety and possible titration to anti-depressant with less weight related effects. \par \par HCM: \par - TDAP 2013\par - Pap smear: Reports recent negative smear, requested records\par - Mammogram: Reported recent negative mammogram (within last year), requested records\par - Hep C and HIV negative \par \par D/w Dr. Cool\par RTC in 5 weeks for refills for zolpidem

## 2019-04-11 DIAGNOSIS — R73.03 PREDIABETES: ICD-10-CM

## 2019-04-11 DIAGNOSIS — G47.00 INSOMNIA, UNSPECIFIED: ICD-10-CM

## 2019-04-11 DIAGNOSIS — F41.9 ANXIETY DISORDER, UNSPECIFIED: ICD-10-CM

## 2019-04-11 DIAGNOSIS — F32.9 MAJOR DEPRESSIVE DISORDER, SINGLE EPISODE, UNSPECIFIED: ICD-10-CM

## 2019-05-29 ENCOUNTER — APPOINTMENT (OUTPATIENT)
Dept: INTERNAL MEDICINE | Facility: CLINIC | Age: 59
End: 2019-05-29

## 2019-06-19 ENCOUNTER — APPOINTMENT (OUTPATIENT)
Dept: INTERNAL MEDICINE | Facility: CLINIC | Age: 59
End: 2019-06-19

## 2019-07-24 ENCOUNTER — APPOINTMENT (OUTPATIENT)
Dept: INTERNAL MEDICINE | Facility: CLINIC | Age: 59
End: 2019-07-24

## 2020-02-19 NOTE — ED ADULT NURSE REASSESSMENT NOTE - SYMPTOMS
Pain to L arm
80yo Female with FHx of MI (brother in late 80s) and PMHx of Breast CA s/p mastectomy/chemo/radiation, Ovarian CA s/p chemo/radiation and Temporal arteritis who originally presented to [facility]/18/20 c/o sudden sharp RLQ pain that started after a salad and cottage cheese, with associated nausea and 5 episodes of non bloody emesis. Pt found to be in HTN urgency () which resolved on its own with no medications, and mild trop I elevation. Pt now transferred to [facility] for further management of troponemia.

## 2021-11-03 ENCOUNTER — EMERGENCY (EMERGENCY)
Facility: HOSPITAL | Age: 61
LOS: 1 days | Discharge: HOME | End: 2021-11-03
Attending: EMERGENCY MEDICINE
Payer: COMMERCIAL

## 2021-11-03 VITALS
OXYGEN SATURATION: 98 % | TEMPERATURE: 98 F | DIASTOLIC BLOOD PRESSURE: 84 MMHG | SYSTOLIC BLOOD PRESSURE: 130 MMHG | WEIGHT: 149.91 LBS | RESPIRATION RATE: 16 BRPM | HEART RATE: 81 BPM

## 2021-11-03 LAB
ALBUMIN SERPL ELPH-MCNC: 4.4 G/DL — SIGNIFICANT CHANGE UP (ref 3.3–5)
ALP SERPL-CCNC: 71 U/L — SIGNIFICANT CHANGE UP (ref 40–120)
ALT FLD-CCNC: 34 U/L — SIGNIFICANT CHANGE UP (ref 10–45)
ANION GAP SERPL CALC-SCNC: 14 MMOL/L — SIGNIFICANT CHANGE UP (ref 5–17)
APPEARANCE UR: CLEAR — SIGNIFICANT CHANGE UP
AST SERPL-CCNC: 37 U/L — SIGNIFICANT CHANGE UP (ref 10–40)
BACTERIA # UR AUTO: NEGATIVE — SIGNIFICANT CHANGE UP
BASE EXCESS BLDV CALC-SCNC: 3.4 MMOL/L — HIGH (ref -2–2)
BASOPHILS # BLD AUTO: 0.03 K/UL — SIGNIFICANT CHANGE UP (ref 0–0.2)
BASOPHILS NFR BLD AUTO: 0.6 % — SIGNIFICANT CHANGE UP (ref 0–2)
BILIRUB SERPL-MCNC: 0.3 MG/DL — SIGNIFICANT CHANGE UP (ref 0.2–1.2)
BILIRUB UR-MCNC: NEGATIVE — SIGNIFICANT CHANGE UP
BUN SERPL-MCNC: 12 MG/DL — SIGNIFICANT CHANGE UP (ref 7–23)
CA-I SERPL-SCNC: 1.2 MMOL/L — SIGNIFICANT CHANGE UP (ref 1.15–1.33)
CALCIUM SERPL-MCNC: 9.4 MG/DL — SIGNIFICANT CHANGE UP (ref 8.4–10.5)
CHLORIDE BLDV-SCNC: 104 MMOL/L — SIGNIFICANT CHANGE UP (ref 96–108)
CHLORIDE SERPL-SCNC: 100 MMOL/L — SIGNIFICANT CHANGE UP (ref 96–108)
CO2 BLDV-SCNC: 31 MMOL/L — HIGH (ref 22–26)
CO2 SERPL-SCNC: 23 MMOL/L — SIGNIFICANT CHANGE UP (ref 22–31)
COLOR SPEC: SIGNIFICANT CHANGE UP
CREAT SERPL-MCNC: 0.57 MG/DL — SIGNIFICANT CHANGE UP (ref 0.5–1.3)
DIFF PNL FLD: NEGATIVE — SIGNIFICANT CHANGE UP
EOSINOPHIL # BLD AUTO: 0.09 K/UL — SIGNIFICANT CHANGE UP (ref 0–0.5)
EOSINOPHIL NFR BLD AUTO: 1.7 % — SIGNIFICANT CHANGE UP (ref 0–6)
EPI CELLS # UR: 2 /HPF — SIGNIFICANT CHANGE UP
GAS PNL BLDV: 136 MMOL/L — SIGNIFICANT CHANGE UP (ref 136–145)
GAS PNL BLDV: SIGNIFICANT CHANGE UP
GAS PNL BLDV: SIGNIFICANT CHANGE UP
GLUCOSE BLDV-MCNC: 123 MG/DL — HIGH (ref 70–99)
GLUCOSE SERPL-MCNC: 125 MG/DL — HIGH (ref 70–99)
GLUCOSE UR QL: NEGATIVE — SIGNIFICANT CHANGE UP
HCG UR QL: NEGATIVE — SIGNIFICANT CHANGE UP
HCO3 BLDV-SCNC: 30 MMOL/L — HIGH (ref 22–29)
HCT VFR BLD CALC: 41.7 % — SIGNIFICANT CHANGE UP (ref 34.5–45)
HCT VFR BLDA CALC: 43 % — SIGNIFICANT CHANGE UP (ref 34.5–46.5)
HGB BLD CALC-MCNC: 14.2 G/DL — SIGNIFICANT CHANGE UP (ref 11.7–16.1)
HGB BLD-MCNC: 13.9 G/DL — SIGNIFICANT CHANGE UP (ref 11.5–15.5)
HYALINE CASTS # UR AUTO: 3 /LPF — HIGH (ref 0–2)
IMM GRANULOCYTES NFR BLD AUTO: 0.2 % — SIGNIFICANT CHANGE UP (ref 0–1.5)
KETONES UR-MCNC: NEGATIVE — SIGNIFICANT CHANGE UP
LACTATE BLDV-MCNC: 1 MMOL/L — SIGNIFICANT CHANGE UP (ref 0.7–2)
LEUKOCYTE ESTERASE UR-ACNC: ABNORMAL
LYMPHOCYTES # BLD AUTO: 2.1 K/UL — SIGNIFICANT CHANGE UP (ref 1–3.3)
LYMPHOCYTES # BLD AUTO: 40.4 % — SIGNIFICANT CHANGE UP (ref 13–44)
MCHC RBC-ENTMCNC: 29.3 PG — SIGNIFICANT CHANGE UP (ref 27–34)
MCHC RBC-ENTMCNC: 33.3 GM/DL — SIGNIFICANT CHANGE UP (ref 32–36)
MCV RBC AUTO: 87.8 FL — SIGNIFICANT CHANGE UP (ref 80–100)
MONOCYTES # BLD AUTO: 0.44 K/UL — SIGNIFICANT CHANGE UP (ref 0–0.9)
MONOCYTES NFR BLD AUTO: 8.5 % — SIGNIFICANT CHANGE UP (ref 2–14)
NEUTROPHILS # BLD AUTO: 2.53 K/UL — SIGNIFICANT CHANGE UP (ref 1.8–7.4)
NEUTROPHILS NFR BLD AUTO: 48.6 % — SIGNIFICANT CHANGE UP (ref 43–77)
NITRITE UR-MCNC: NEGATIVE — SIGNIFICANT CHANGE UP
NRBC # BLD: 0 /100 WBCS — SIGNIFICANT CHANGE UP (ref 0–0)
PCO2 BLDV: 50 MMHG — HIGH (ref 39–42)
PH BLDV: 7.38 — SIGNIFICANT CHANGE UP (ref 7.32–7.43)
PH UR: 6 — SIGNIFICANT CHANGE UP (ref 5–8)
PLATELET # BLD AUTO: 301 K/UL — SIGNIFICANT CHANGE UP (ref 150–400)
PO2 BLDV: 37 MMHG — SIGNIFICANT CHANGE UP (ref 25–45)
POTASSIUM BLDV-SCNC: 5.5 MMOL/L — HIGH (ref 3.5–5.1)
POTASSIUM SERPL-MCNC: 4.7 MMOL/L — SIGNIFICANT CHANGE UP (ref 3.5–5.3)
POTASSIUM SERPL-SCNC: 4.7 MMOL/L — SIGNIFICANT CHANGE UP (ref 3.5–5.3)
PROT SERPL-MCNC: 7.8 G/DL — SIGNIFICANT CHANGE UP (ref 6–8.3)
PROT UR-MCNC: NEGATIVE — SIGNIFICANT CHANGE UP
RBC # BLD: 4.75 M/UL — SIGNIFICANT CHANGE UP (ref 3.8–5.2)
RBC # FLD: 13.4 % — SIGNIFICANT CHANGE UP (ref 10.3–14.5)
RBC CASTS # UR COMP ASSIST: 0 /HPF — SIGNIFICANT CHANGE UP (ref 0–4)
SAO2 % BLDV: 68.1 % — SIGNIFICANT CHANGE UP (ref 67–88)
SODIUM SERPL-SCNC: 137 MMOL/L — SIGNIFICANT CHANGE UP (ref 135–145)
SP GR SPEC: 1.01 — LOW (ref 1.01–1.02)
UROBILINOGEN FLD QL: NEGATIVE — SIGNIFICANT CHANGE UP
WBC # BLD: 5.2 K/UL — SIGNIFICANT CHANGE UP (ref 3.8–10.5)
WBC # FLD AUTO: 5.2 K/UL — SIGNIFICANT CHANGE UP (ref 3.8–10.5)
WBC UR QL: 2 /HPF — SIGNIFICANT CHANGE UP (ref 0–5)

## 2021-11-03 PROCEDURE — 82330 ASSAY OF CALCIUM: CPT

## 2021-11-03 PROCEDURE — 85014 HEMATOCRIT: CPT

## 2021-11-03 PROCEDURE — 99284 EMERGENCY DEPT VISIT MOD MDM: CPT

## 2021-11-03 PROCEDURE — 81025 URINE PREGNANCY TEST: CPT

## 2021-11-03 PROCEDURE — 82435 ASSAY OF BLOOD CHLORIDE: CPT

## 2021-11-03 PROCEDURE — 80053 COMPREHEN METABOLIC PANEL: CPT

## 2021-11-03 PROCEDURE — 82803 BLOOD GASES ANY COMBINATION: CPT

## 2021-11-03 PROCEDURE — 99283 EMERGENCY DEPT VISIT LOW MDM: CPT

## 2021-11-03 PROCEDURE — 82565 ASSAY OF CREATININE: CPT

## 2021-11-03 PROCEDURE — 84132 ASSAY OF SERUM POTASSIUM: CPT

## 2021-11-03 PROCEDURE — 85025 COMPLETE CBC W/AUTO DIFF WBC: CPT

## 2021-11-03 PROCEDURE — 83605 ASSAY OF LACTIC ACID: CPT

## 2021-11-03 PROCEDURE — 82947 ASSAY GLUCOSE BLOOD QUANT: CPT

## 2021-11-03 PROCEDURE — 81001 URINALYSIS AUTO W/SCOPE: CPT

## 2021-11-03 PROCEDURE — 85018 HEMOGLOBIN: CPT

## 2021-11-03 PROCEDURE — 87086 URINE CULTURE/COLONY COUNT: CPT

## 2021-11-03 PROCEDURE — 84295 ASSAY OF SERUM SODIUM: CPT

## 2021-11-03 RX ORDER — ONDANSETRON 8 MG/1
1 TABLET, FILM COATED ORAL
Qty: 5 | Refills: 0
Start: 2021-11-03

## 2021-11-03 NOTE — ED PROVIDER NOTE - CARE PLAN
Principal Discharge DX:	Depression, reactive  Assessment and plan of treatment:	61F p/w feelings of sadness, nausea, decreased po intake x4d.  No thoughts of self harm or SI  Labs unremarkable  Plan for DC home w/ psych f/u   1 Principal Discharge DX:	Mild dehydration  Assessment and plan of treatment:	61F p/w feelings of sadness, nausea, decreased po intake x4d.  No thoughts of self harm or SI  Labs unremarkable  Plan for DC home w/ psych f/u

## 2021-11-03 NOTE — ED PROVIDER NOTE - ATTENDING CONTRIBUTION TO CARE
Pt presents with decreased PO intake with feelings of saddness.  No SI/HI/AH/VH.  No fever, vomiting.  Pt appears well, nontoxic, normal mood and affect.  Screening labs for metabolic check r/o dehydration, outpatient referral.  No indication for emergency psychiatric evaluation.

## 2021-11-03 NOTE — ED PROVIDER NOTE - PLAN OF CARE
61F p/w feelings of sadness, nausea, decreased po intake x4d.  No thoughts of self harm or SI  Labs unremarkable  Plan for DC home w/ psych f/u

## 2021-11-03 NOTE — ED PROVIDER NOTE - CLINICAL SUMMARY MEDICAL DECISION MAKING FREE TEXT BOX
61F p/w feelings of sadness, nausea, decreased po intake x4d since her sister said something mean to her. No thoughts of self harm or SI. Labs unremarkable. Plan for DC home w/ psych f/u

## 2021-11-03 NOTE — ED ADULT TRIAGE NOTE - CHIEF COMPLAINT QUOTE
weakness, no appetite and dec PO intake x the passed 4 days associated with feeling depressed/ down , denies any SI/HI , hallucinations- pt sent from urgent care

## 2021-11-03 NOTE — ED PROVIDER NOTE - NS ED ROS FT
Constitutional: no weight change, no fever, no chills, no night sweats, no fatigue  ENT/mouth: no hearing changes, no sore throat, no rhinorrhea  Eyes: no eye pain, no eye redness, no eye swelling, no vision changes  CV: no chest pain, no orthopnea, no palpitations  Resp: no shortness of breath, no cough, no wheezing  GI: + nausea, no abdominal pain, no vomiting, no diarrhea, no constipation  : no dysuria, no urinary frequency or hesitancy, no hematuria  Skin/MSK: no pain, no rashes, no lower extremity edema  Neuro: no weakness, no numbness, no loss of consciousness, no syncope, no dizziness, no headache

## 2021-11-03 NOTE — ED PROVIDER NOTE - NSFOLLOWUPCLINICS_GEN_ALL_ED_FT
[facility]  Psychiatry  [address] [place] [address]  Phone: [phone]  Fax:     [facility]  Psychiatry  [address] [address]  Phone: [phone]  Fax:

## 2021-11-03 NOTE — ED PROVIDER NOTE - PATIENT PORTAL LINK FT
You can access the FollowMyHealth Patient Portal offered by [facility] by registering at the following website: [email or web address]. By joining [employer]’s FollowMyHealth portal, you will also be able to view your health information using other applications (apps) compatible with our system.

## 2021-11-03 NOTE — ED PROVIDER NOTE - RAPID ASSESSMENT
62 y/o F presents to ED as referral from Urgent Care for dehydration. Pt endorses generalized weakness and decreased PO intake.    Patient was seen as a tele QDOC patient. The patient will be seen and further worked up in the main emergency department and their care will be completed by the main emergency department team along with a thorough physical exam. Receiving team will follow up on labs, analgesia, any clinical imaging, reassess and disposition as clinically indicated, all decisions regarding the progression of care will be made at their discretion.    Scribe Statement: Desmond MENSAH, attest that this documentation has been prepared under the direction and in the presence of Yair Jones (PA) 60 y/o F presents to ED as referral from Urgent Care for dehydration. Pt endorses generalized weakness and decreased PO intake.    Patient was seen as a tele QDOC patient. The patient will be seen and further worked up in the main emergency department and their care will be completed by the main emergency department team along with a thorough physical exam. Receiving team will follow up on labs, analgesia, any clinical imaging, reassess and disposition as clinically indicated, all decisions regarding the progression of care will be made at their discretion.    Scribe Statement: IDesmond, attest that this documentation has been prepared under the direction and in the presence of Yair Jones (PA)  Yair MENSAH, personally performed the service described in the documentation recorded by the scribe in my presence, and it accurately and completely records my words and actions.

## 2021-11-03 NOTE — ED PROVIDER NOTE - PHYSICAL EXAMINATION
Gen: no acute distress  HEENT: atraumatic, normocephalic, pupils equally round and reactive to light, extraocular muscles intact, no conjunctival injection  CV: regular rate and rhythym, normal S1/S2, no murmurs, rubs, or gallops  Resp: lungs clear to auscultation bilaterally, no rales, rhonchi, or wheezes  GI: soft, nontender, nondistended, BSx4  MSK: extremities atraumatic, no cyanosis or clubbing  Skin: warm, dry, no rashes or lesions  Neuro: no focal deficits, sensation grossly intact  Psych: alert and oriented x3, appropriate mood and affect

## 2021-11-03 NOTE — ED PROVIDER NOTE - OBJECTIVE STATEMENT
61F PMH hypothyroidism p/w depression, weakness and decreased po intake x4d. Patient reports her sister "said something I didn't like' 4 days ago and she has been having feelings of sadness since then. She denies any thoughts of SI or self-harm. She has also had some nausea and decreased po intake due to lack of appetite. She denies any abd pain, vomiting, fever, chills, CP, SOB, urinary changes, or changes in BH. She is vaccinated for COVId and reportedlytested negative in the past few days.

## 2021-11-06 LAB
CULTURE RESULTS: SIGNIFICANT CHANGE UP
SPECIMEN SOURCE: SIGNIFICANT CHANGE UP

## 2021-11-07 NOTE — ED POST DISCHARGE NOTE - DETAILS
11/7/21:  Jose Alberto (#[id number]). Left voicemail informing that I had results to discuss with the patient, gave call back number. -Fritz Ventura PA-C 36.7 11/8 Jason Treadwell PA-C: Left vm for 1522 cb ( [id number]) 11/8 Jason Treadwell PA-C: s/w pt ([phone]) using  [id number]. Results discussed. Pt endorsing a few days of dysuria. No f/c or bp. Seen by PMD shortly after DC. Macrobid and pyridium sent to confirmed pharmacy. Appreciative of call.

## 2021-11-08 RX ORDER — NITROFURANTOIN MACROCRYSTAL 50 MG
1 CAPSULE ORAL
Qty: 10 | Refills: 0
Start: 2021-11-08 | End: 2021-11-12

## 2021-11-08 RX ORDER — PHENAZOPYRIDINE HCL 100 MG
1 TABLET ORAL
Qty: 6 | Refills: 0
Start: 2021-11-08 | End: 2021-11-09

## 2022-03-21 ENCOUNTER — RESULT REVIEW (OUTPATIENT)
Age: 62
End: 2022-03-21

## 2022-04-04 ENCOUNTER — RESULT REVIEW (OUTPATIENT)
Age: 62
End: 2022-04-04

## 2023-10-29 ENCOUNTER — EMERGENCY (EMERGENCY)
Facility: HOSPITAL | Age: 63
LOS: 1 days | Discharge: HOME | End: 2023-10-29
Attending: EMERGENCY MEDICINE
Payer: COMMERCIAL

## 2023-10-29 VITALS
TEMPERATURE: 98 F | SYSTOLIC BLOOD PRESSURE: 152 MMHG | HEART RATE: 96 BPM | WEIGHT: 145.06 LBS | OXYGEN SATURATION: 97 % | RESPIRATION RATE: 17 BRPM | DIASTOLIC BLOOD PRESSURE: 85 MMHG

## 2023-10-29 VITALS
DIASTOLIC BLOOD PRESSURE: 78 MMHG | TEMPERATURE: 98 F | RESPIRATION RATE: 18 BRPM | HEART RATE: 89 BPM | SYSTOLIC BLOOD PRESSURE: 123 MMHG | OXYGEN SATURATION: 95 %

## 2023-10-29 LAB
ALBUMIN SERPL ELPH-MCNC: 4.3 G/DL — SIGNIFICANT CHANGE UP (ref 3.3–5)
ALBUMIN SERPL ELPH-MCNC: 4.3 G/DL — SIGNIFICANT CHANGE UP (ref 3.3–5)
ALP SERPL-CCNC: 67 U/L — SIGNIFICANT CHANGE UP (ref 40–120)
ALP SERPL-CCNC: 67 U/L — SIGNIFICANT CHANGE UP (ref 40–120)
ALT FLD-CCNC: 59 U/L — HIGH (ref 10–45)
ALT FLD-CCNC: 59 U/L — HIGH (ref 10–45)
ANION GAP SERPL CALC-SCNC: 9 MMOL/L — SIGNIFICANT CHANGE UP (ref 5–17)
ANION GAP SERPL CALC-SCNC: 9 MMOL/L — SIGNIFICANT CHANGE UP (ref 5–17)
AST SERPL-CCNC: 38 U/L — SIGNIFICANT CHANGE UP (ref 10–40)
AST SERPL-CCNC: 38 U/L — SIGNIFICANT CHANGE UP (ref 10–40)
BASE EXCESS BLDV CALC-SCNC: 3.5 MMOL/L — HIGH (ref -2–3)
BASE EXCESS BLDV CALC-SCNC: 3.5 MMOL/L — HIGH (ref -2–3)
BASOPHILS # BLD AUTO: 0.03 K/UL — SIGNIFICANT CHANGE UP (ref 0–0.2)
BASOPHILS # BLD AUTO: 0.03 K/UL — SIGNIFICANT CHANGE UP (ref 0–0.2)
BASOPHILS NFR BLD AUTO: 0.6 % — SIGNIFICANT CHANGE UP (ref 0–2)
BASOPHILS NFR BLD AUTO: 0.6 % — SIGNIFICANT CHANGE UP (ref 0–2)
BILIRUB SERPL-MCNC: 0.2 MG/DL — SIGNIFICANT CHANGE UP (ref 0.2–1.2)
BILIRUB SERPL-MCNC: 0.2 MG/DL — SIGNIFICANT CHANGE UP (ref 0.2–1.2)
BUN SERPL-MCNC: 22 MG/DL — SIGNIFICANT CHANGE UP (ref 7–23)
BUN SERPL-MCNC: 22 MG/DL — SIGNIFICANT CHANGE UP (ref 7–23)
CA-I SERPL-SCNC: 1.26 MMOL/L — SIGNIFICANT CHANGE UP (ref 1.15–1.33)
CA-I SERPL-SCNC: 1.26 MMOL/L — SIGNIFICANT CHANGE UP (ref 1.15–1.33)
CALCIUM SERPL-MCNC: 9.3 MG/DL — SIGNIFICANT CHANGE UP (ref 8.4–10.5)
CALCIUM SERPL-MCNC: 9.3 MG/DL — SIGNIFICANT CHANGE UP (ref 8.4–10.5)
CHLORIDE BLDV-SCNC: 103 MMOL/L — SIGNIFICANT CHANGE UP (ref 96–108)
CHLORIDE BLDV-SCNC: 103 MMOL/L — SIGNIFICANT CHANGE UP (ref 96–108)
CHLORIDE SERPL-SCNC: 104 MMOL/L — SIGNIFICANT CHANGE UP (ref 96–108)
CHLORIDE SERPL-SCNC: 104 MMOL/L — SIGNIFICANT CHANGE UP (ref 96–108)
CO2 BLDV-SCNC: 33 MMOL/L — HIGH (ref 22–26)
CO2 BLDV-SCNC: 33 MMOL/L — HIGH (ref 22–26)
CO2 SERPL-SCNC: 27 MMOL/L — SIGNIFICANT CHANGE UP (ref 22–31)
CO2 SERPL-SCNC: 27 MMOL/L — SIGNIFICANT CHANGE UP (ref 22–31)
CREAT SERPL-MCNC: 0.73 MG/DL — SIGNIFICANT CHANGE UP (ref 0.5–1.3)
CREAT SERPL-MCNC: 0.73 MG/DL — SIGNIFICANT CHANGE UP (ref 0.5–1.3)
EGFR: 92 ML/MIN/1.73M2 — SIGNIFICANT CHANGE UP
EGFR: 92 ML/MIN/1.73M2 — SIGNIFICANT CHANGE UP
EOSINOPHIL # BLD AUTO: 0.21 K/UL — SIGNIFICANT CHANGE UP (ref 0–0.5)
EOSINOPHIL # BLD AUTO: 0.21 K/UL — SIGNIFICANT CHANGE UP (ref 0–0.5)
EOSINOPHIL NFR BLD AUTO: 3.9 % — SIGNIFICANT CHANGE UP (ref 0–6)
EOSINOPHIL NFR BLD AUTO: 3.9 % — SIGNIFICANT CHANGE UP (ref 0–6)
FLUAV AG NPH QL: SIGNIFICANT CHANGE UP
FLUAV AG NPH QL: SIGNIFICANT CHANGE UP
FLUBV AG NPH QL: SIGNIFICANT CHANGE UP
FLUBV AG NPH QL: SIGNIFICANT CHANGE UP
GAS PNL BLDV: 136 MMOL/L — SIGNIFICANT CHANGE UP (ref 136–145)
GAS PNL BLDV: 136 MMOL/L — SIGNIFICANT CHANGE UP (ref 136–145)
GAS PNL BLDV: SIGNIFICANT CHANGE UP
GAS PNL BLDV: SIGNIFICANT CHANGE UP
GLUCOSE BLDV-MCNC: 118 MG/DL — HIGH (ref 70–99)
GLUCOSE BLDV-MCNC: 118 MG/DL — HIGH (ref 70–99)
GLUCOSE SERPL-MCNC: 122 MG/DL — HIGH (ref 70–99)
GLUCOSE SERPL-MCNC: 122 MG/DL — HIGH (ref 70–99)
HCO3 BLDV-SCNC: 31 MMOL/L — HIGH (ref 22–29)
HCO3 BLDV-SCNC: 31 MMOL/L — HIGH (ref 22–29)
HCT VFR BLD CALC: 41.4 % — SIGNIFICANT CHANGE UP (ref 34.5–45)
HCT VFR BLD CALC: 41.4 % — SIGNIFICANT CHANGE UP (ref 34.5–45)
HCT VFR BLDA CALC: 42 % — SIGNIFICANT CHANGE UP (ref 34.5–46.5)
HCT VFR BLDA CALC: 42 % — SIGNIFICANT CHANGE UP (ref 34.5–46.5)
HGB BLD CALC-MCNC: 13.9 G/DL — SIGNIFICANT CHANGE UP (ref 11.7–16.1)
HGB BLD CALC-MCNC: 13.9 G/DL — SIGNIFICANT CHANGE UP (ref 11.7–16.1)
HGB BLD-MCNC: 13.8 G/DL — SIGNIFICANT CHANGE UP (ref 11.5–15.5)
HGB BLD-MCNC: 13.8 G/DL — SIGNIFICANT CHANGE UP (ref 11.5–15.5)
IMM GRANULOCYTES NFR BLD AUTO: 0.2 % — SIGNIFICANT CHANGE UP (ref 0–0.9)
IMM GRANULOCYTES NFR BLD AUTO: 0.2 % — SIGNIFICANT CHANGE UP (ref 0–0.9)
LACTATE BLDV-MCNC: 1.4 MMOL/L — SIGNIFICANT CHANGE UP (ref 0.5–2)
LACTATE BLDV-MCNC: 1.4 MMOL/L — SIGNIFICANT CHANGE UP (ref 0.5–2)
LYMPHOCYTES # BLD AUTO: 1.74 K/UL — SIGNIFICANT CHANGE UP (ref 1–3.3)
LYMPHOCYTES # BLD AUTO: 1.74 K/UL — SIGNIFICANT CHANGE UP (ref 1–3.3)
LYMPHOCYTES # BLD AUTO: 32 % — SIGNIFICANT CHANGE UP (ref 13–44)
LYMPHOCYTES # BLD AUTO: 32 % — SIGNIFICANT CHANGE UP (ref 13–44)
MCHC RBC-ENTMCNC: 28 PG — SIGNIFICANT CHANGE UP (ref 27–34)
MCHC RBC-ENTMCNC: 28 PG — SIGNIFICANT CHANGE UP (ref 27–34)
MCHC RBC-ENTMCNC: 33.3 GM/DL — SIGNIFICANT CHANGE UP (ref 32–36)
MCHC RBC-ENTMCNC: 33.3 GM/DL — SIGNIFICANT CHANGE UP (ref 32–36)
MCV RBC AUTO: 84 FL — SIGNIFICANT CHANGE UP (ref 80–100)
MCV RBC AUTO: 84 FL — SIGNIFICANT CHANGE UP (ref 80–100)
MONOCYTES # BLD AUTO: 0.52 K/UL — SIGNIFICANT CHANGE UP (ref 0–0.9)
MONOCYTES # BLD AUTO: 0.52 K/UL — SIGNIFICANT CHANGE UP (ref 0–0.9)
MONOCYTES NFR BLD AUTO: 9.6 % — SIGNIFICANT CHANGE UP (ref 2–14)
MONOCYTES NFR BLD AUTO: 9.6 % — SIGNIFICANT CHANGE UP (ref 2–14)
NEUTROPHILS # BLD AUTO: 2.92 K/UL — SIGNIFICANT CHANGE UP (ref 1.8–7.4)
NEUTROPHILS # BLD AUTO: 2.92 K/UL — SIGNIFICANT CHANGE UP (ref 1.8–7.4)
NEUTROPHILS NFR BLD AUTO: 53.7 % — SIGNIFICANT CHANGE UP (ref 43–77)
NEUTROPHILS NFR BLD AUTO: 53.7 % — SIGNIFICANT CHANGE UP (ref 43–77)
NRBC # BLD: 0 /100 WBCS — SIGNIFICANT CHANGE UP (ref 0–0)
NRBC # BLD: 0 /100 WBCS — SIGNIFICANT CHANGE UP (ref 0–0)
PCO2 BLDV: 59 MMHG — HIGH (ref 39–42)
PCO2 BLDV: 59 MMHG — HIGH (ref 39–42)
PH BLDV: 7.33 — SIGNIFICANT CHANGE UP (ref 7.32–7.43)
PH BLDV: 7.33 — SIGNIFICANT CHANGE UP (ref 7.32–7.43)
PLATELET # BLD AUTO: 285 K/UL — SIGNIFICANT CHANGE UP (ref 150–400)
PLATELET # BLD AUTO: 285 K/UL — SIGNIFICANT CHANGE UP (ref 150–400)
PO2 BLDV: 28 MMHG — SIGNIFICANT CHANGE UP (ref 25–45)
PO2 BLDV: 28 MMHG — SIGNIFICANT CHANGE UP (ref 25–45)
POTASSIUM BLDV-SCNC: 4.1 MMOL/L — SIGNIFICANT CHANGE UP (ref 3.5–5.1)
POTASSIUM BLDV-SCNC: 4.1 MMOL/L — SIGNIFICANT CHANGE UP (ref 3.5–5.1)
POTASSIUM SERPL-MCNC: 4.6 MMOL/L — SIGNIFICANT CHANGE UP (ref 3.5–5.3)
POTASSIUM SERPL-MCNC: 4.6 MMOL/L — SIGNIFICANT CHANGE UP (ref 3.5–5.3)
POTASSIUM SERPL-SCNC: 4.6 MMOL/L — SIGNIFICANT CHANGE UP (ref 3.5–5.3)
POTASSIUM SERPL-SCNC: 4.6 MMOL/L — SIGNIFICANT CHANGE UP (ref 3.5–5.3)
PROT SERPL-MCNC: 7.6 G/DL — SIGNIFICANT CHANGE UP (ref 6–8.3)
PROT SERPL-MCNC: 7.6 G/DL — SIGNIFICANT CHANGE UP (ref 6–8.3)
RBC # BLD: 4.93 M/UL — SIGNIFICANT CHANGE UP (ref 3.8–5.2)
RBC # BLD: 4.93 M/UL — SIGNIFICANT CHANGE UP (ref 3.8–5.2)
RBC # FLD: 13.6 % — SIGNIFICANT CHANGE UP (ref 10.3–14.5)
RBC # FLD: 13.6 % — SIGNIFICANT CHANGE UP (ref 10.3–14.5)
RHEUMATOID FACT SERPL-ACNC: <10 IU/ML — SIGNIFICANT CHANGE UP (ref 0–13)
RHEUMATOID FACT SERPL-ACNC: <10 IU/ML — SIGNIFICANT CHANGE UP (ref 0–13)
RSV RNA NPH QL NAA+NON-PROBE: SIGNIFICANT CHANGE UP
RSV RNA NPH QL NAA+NON-PROBE: SIGNIFICANT CHANGE UP
S PYO AG SPEC QL IA: NEGATIVE — SIGNIFICANT CHANGE UP
S PYO AG SPEC QL IA: NEGATIVE — SIGNIFICANT CHANGE UP
SAO2 % BLDV: 40.8 % — LOW (ref 67–88)
SAO2 % BLDV: 40.8 % — LOW (ref 67–88)
SARS-COV-2 RNA SPEC QL NAA+PROBE: SIGNIFICANT CHANGE UP
SARS-COV-2 RNA SPEC QL NAA+PROBE: SIGNIFICANT CHANGE UP
SODIUM SERPL-SCNC: 140 MMOL/L — SIGNIFICANT CHANGE UP (ref 135–145)
SODIUM SERPL-SCNC: 140 MMOL/L — SIGNIFICANT CHANGE UP (ref 135–145)
WBC # BLD: 5.43 K/UL — SIGNIFICANT CHANGE UP (ref 3.8–10.5)
WBC # BLD: 5.43 K/UL — SIGNIFICANT CHANGE UP (ref 3.8–10.5)
WBC # FLD AUTO: 5.43 K/UL — SIGNIFICANT CHANGE UP (ref 3.8–10.5)
WBC # FLD AUTO: 5.43 K/UL — SIGNIFICANT CHANGE UP (ref 3.8–10.5)

## 2023-10-29 PROCEDURE — 82330 ASSAY OF CALCIUM: CPT

## 2023-10-29 PROCEDURE — 96375 TX/PRO/DX INJ NEW DRUG ADDON: CPT | Mod: XU

## 2023-10-29 PROCEDURE — 82947 ASSAY GLUCOSE BLOOD QUANT: CPT

## 2023-10-29 PROCEDURE — 83605 ASSAY OF LACTIC ACID: CPT

## 2023-10-29 PROCEDURE — 85014 HEMATOCRIT: CPT

## 2023-10-29 PROCEDURE — 84132 ASSAY OF SERUM POTASSIUM: CPT

## 2023-10-29 PROCEDURE — 96374 THER/PROPH/DIAG INJ IV PUSH: CPT | Mod: XU

## 2023-10-29 PROCEDURE — 76536 US EXAM OF HEAD AND NECK: CPT | Mod: 26

## 2023-10-29 PROCEDURE — 84436 ASSAY OF TOTAL THYROXINE: CPT

## 2023-10-29 PROCEDURE — 86431 RHEUMATOID FACTOR QUANT: CPT

## 2023-10-29 PROCEDURE — 82435 ASSAY OF BLOOD CHLORIDE: CPT

## 2023-10-29 PROCEDURE — 84480 ASSAY TRIIODOTHYRONINE (T3): CPT

## 2023-10-29 PROCEDURE — 85018 HEMOGLOBIN: CPT

## 2023-10-29 PROCEDURE — 87081 CULTURE SCREEN ONLY: CPT

## 2023-10-29 PROCEDURE — 76536 US EXAM OF HEAD AND NECK: CPT

## 2023-10-29 PROCEDURE — 84439 ASSAY OF FREE THYROXINE: CPT

## 2023-10-29 PROCEDURE — 84295 ASSAY OF SERUM SODIUM: CPT

## 2023-10-29 PROCEDURE — 86038 ANTINUCLEAR ANTIBODIES: CPT

## 2023-10-29 PROCEDURE — 87880 STREP A ASSAY W/OPTIC: CPT

## 2023-10-29 PROCEDURE — 85025 COMPLETE CBC W/AUTO DIFF WBC: CPT

## 2023-10-29 PROCEDURE — 84443 ASSAY THYROID STIM HORMONE: CPT

## 2023-10-29 PROCEDURE — 99284 EMERGENCY DEPT VISIT MOD MDM: CPT | Mod: 25

## 2023-10-29 PROCEDURE — 70487 CT MAXILLOFACIAL W/DYE: CPT | Mod: MA

## 2023-10-29 PROCEDURE — 70487 CT MAXILLOFACIAL W/DYE: CPT | Mod: 26,MA

## 2023-10-29 PROCEDURE — 82803 BLOOD GASES ANY COMBINATION: CPT

## 2023-10-29 PROCEDURE — 86665 EPSTEIN-BARR CAPSID VCA: CPT

## 2023-10-29 PROCEDURE — 86663 EPSTEIN-BARR ANTIBODY: CPT

## 2023-10-29 PROCEDURE — 87637 SARSCOV2&INF A&B&RSV AMP PRB: CPT

## 2023-10-29 PROCEDURE — 80053 COMPREHEN METABOLIC PANEL: CPT

## 2023-10-29 PROCEDURE — 86664 EPSTEIN-BARR NUCLEAR ANTIGEN: CPT

## 2023-10-29 PROCEDURE — 99285 EMERGENCY DEPT VISIT HI MDM: CPT | Mod: 25

## 2023-10-29 RX ORDER — KETOROLAC TROMETHAMINE 30 MG/ML
15 SYRINGE (ML) INJECTION ONCE
Refills: 0 | Status: DISCONTINUED | OUTPATIENT
Start: 2023-10-29 | End: 2023-10-29

## 2023-10-29 RX ORDER — AMPICILLIN SODIUM AND SULBACTAM SODIUM 250; 125 MG/ML; MG/ML
3 INJECTION, POWDER, FOR SUSPENSION INTRAMUSCULAR; INTRAVENOUS ONCE
Refills: 0 | Status: COMPLETED | OUTPATIENT
Start: 2023-10-29 | End: 2023-10-29

## 2023-10-29 RX ORDER — SODIUM CHLORIDE 9 MG/ML
1000 INJECTION INTRAMUSCULAR; INTRAVENOUS; SUBCUTANEOUS ONCE
Refills: 0 | Status: COMPLETED | OUTPATIENT
Start: 2023-10-29 | End: 2023-10-29

## 2023-10-29 RX ADMIN — Medication 15 MILLIGRAM(S): at 20:41

## 2023-10-29 RX ADMIN — AMPICILLIN SODIUM AND SULBACTAM SODIUM 200 GRAM(S): 250; 125 INJECTION, POWDER, FOR SUSPENSION INTRAMUSCULAR; INTRAVENOUS at 20:41

## 2023-10-29 RX ADMIN — SODIUM CHLORIDE 1000 MILLILITER(S): 9 INJECTION INTRAMUSCULAR; INTRAVENOUS; SUBCUTANEOUS at 18:51

## 2023-10-29 NOTE — ED ADULT NURSE REASSESSMENT NOTE - NS ED NURSE REASSESS COMMENT FT1
Report received from Chelsey WILKINS. Pt resting comfortably in stretcher. A&Ox4. VSS. NAD noted. Pain medications and ABx infusing at this time. Patient ambulatory to restroom without difficulty. Plan of care discussed. Safety and comfort measures maintained.

## 2023-10-29 NOTE — ED PROVIDER NOTE - NS ED ATTENDING STATEMENT MOD
Detail Level: Zone This was a shared visit with the CRISTINA. I reviewed and verified the documentation and independently performed the documented:

## 2023-10-29 NOTE — ED PROVIDER NOTE - NSFOLLOWUPINSTRUCTIONS_ED_ALL_ED_FT
Your ct scan showed evidence of paroitits, this is an inflammation of one of the ducts in your face  Please use lemon throat losanges a couple of times a day  Please drink plenty of fluids  You cant take tylenol and or motrin as needed for pain  Please take the antibiotics as prescribed.       What is parotitis?  Parotitis is the medical term for when the parotid gland gets inflamed or infected. The parotid gland makes saliva. It is located under the skin in front of the ear and above the jaw (figure 1). Saliva from the parotid gland flows into the mouth through a thin tube called the "parotid duct."      What causes parotitis?  Different things can cause parotitis. It is often related to infection. For example, it happens in people who have mumps, which is caused by a virus. In some other cases, parotitis is caused by a bacterial infection. This is more likely to happen in people who:    ?Are dehydrated, especially after surgery – Dehydration is when a person's body has lost too much water. Older people who are in the hospital after surgery are at higher risk of getting dehydrated and developing parotitis.      ?Have a blockage in the parotid gland or duct – A blockage can be caused by a small, hard substance called a "stone" that forms on its own. Rarely, a blockage can be caused by cancer.      ?Take a medicine that causes the parotid gland to make less saliva.      Certain medical conditions can also cause parotitis. For example, people with Sjögren's disease (a problem that affects the saliva glands) might be more likely to develop it.      What are the symptoms of parotitis?  Parotitis causes swelling in the parotid gland.    If the parotitis is caused by a bacterial infection, there might be other symptoms, too. That's because the bacteria cause the body to make pus that collects in or around the parotid gland. This can lead to:    ?Pain and redness (along with the swelling) in the area around the parotid gland    ?Fever or chills    ?Trouble opening the mouth or swallowing        Should I call the doctor or nurse?  Yes. Call your doctor or nurse if you have swelling in your jaw.      Will I need tests?  Maybe. Your doctor or nurse will ask about your symptoms and do an exam. They might also do tests, such as:    ?An imaging test – Imaging tests create pictures of the inside of the body. Your doctor might do an ultrasound, CT scan, or MRI scan.      ?Lab tests – If your doctor thinks that you might have mumps, they can take a sample of blood to be tested. Mumps is caused by a virus.      If you have a collection of pus, your doctor can take a sample of the pus. They can send it to the lab to find out what kind of bacteria is causing your infection.        Is there anything I can do on my own to feel better?  Yes. To help your symptoms, your doctor or nurse might recommend that you:    ?Put heat on the swollen area. Wet a clean washcloth with warm water, and put it on the area. When the washcloth cools, reheat it with warm water and put it back on. Repeat these steps for 10 to 15 minutes every few hours.      ?Drink lots of fluids.      ?Gently massage the swollen area.      ?Suck on sour or lemon-flavored hard candy.      ?Take an over-the-counter medicine to treat your pain.        What other treatment might I need?  Other treatment depends on what is causing the parotitis. Parotitis caused by a virus, or not related to an infection at all, doesn't usually need other treatment. The swelling will get better on its own.    If you have parotitis caused by bacteria, you will get antibiotic medicines. These medicines go into your vein through a thin tube, called an "IV." Once you are getting better, you might be switched to antibiotic pills. If you have severe pain, your doctor can prescribe a strong pain medicine. If you have a collection of pus that doesn't get better with antibiotics, your doctor might do surgery to drain the pus.      Can parotitis be prevented?  Yes, in some cases. A vaccine called the "MMR" vaccine can help prevent parotitis. (Vaccines can prevent certain serious or deadly infections.) The MMR vaccine prevents infection with the mumps virus.    Doctors recommend that all children get the MMR vaccine as part of their routine childhood vaccines.

## 2023-10-29 NOTE — ED PROVIDER NOTE - PHYSICAL EXAMINATION
Attending Nuzhat Fry: Gen: NAD, heent: atrauamtic, eomi, perrla, mmm, op pink, uvula midline, hndling secretions, no trismus neck;left cervical lymphadenopathy, ttp left submandibular area. no visible stones in the mouth chest: nttp, no crepitus, cv: rrr, no murmurs, lungs: ctab, abd: soft, nontender, nondistended, no peritoneal signs, +BS, no guarding, ext: wwp, neg homans, skin: no rash, neuro: awake and alert, following commands, speech clear, sensation and strength intact, no focal deficits

## 2023-10-29 NOTE — ED PROVIDER NOTE - CLINICAL SUMMARY MEDICAL DECISION MAKING FREE TEXT BOX
Attending Nuzhat Fry: 63-year-old female with history of hypothyroidism presenting with concern for facial swelling.  Patient states began having swelling to the left side of her neck today.  Reports increased swelling and pain to the area.  No fevers or chills.  No difficulty swallowing.  No history of similar in the past.  Upon arrival patient awake alert commands.  Airway is patent and patient is handling her secretions.  Uvula is midline without any evidence of any peritonsillar abscess on exam.  Patient able to range her neck and no meningeal trismus making meningitis unlikely.  Concern for possible parotitis versus submandibular stone.  No dental pain or tenderness to palpation making dental abscess less likely.  Will obtain labs, CT scan of the neck to further evaluate

## 2023-10-29 NOTE — ED PROVIDER NOTE - PROGRESS NOTE DETAILS
Attending Nuzhat Fry: ct with evidence of parotitis, ?possible septal defect. pt denies any prior nasal surgeries. pt feeling better. will give abx, throat losanges, and likely follow up with ent Attending Nuzhat Fry: pt feeling better. able to tolearte po. will send abx close return precautoins

## 2023-10-29 NOTE — ED ADULT NURSE NOTE - NSFALLUNIVINTERV_ED_ALL_ED
Bed/Stretcher in lowest position, wheels locked, appropriate side rails in place/Call bell, personal items and telephone in reach/Instruct patient to call for assistance before getting out of bed/chair/stretcher/Non-slip footwear applied when patient is off stretcher/[place] to call system/Physically safe environment - no spills, clutter or unnecessary equipment/Purposeful proactive rounding/Room/bathroom lighting operational, light cord in reach

## 2023-10-29 NOTE — ED PROVIDER NOTE - ATTENDING APP SHARED VISIT CONTRIBUTION OF CARE
Attending MD Nuzhat Fry:   I personally have seen and examined this patient.  Physician assistant note reviewed and agree on plan of care and except where noted.  See HPI, PE, and MDM for details.

## 2023-10-29 NOTE — ED PROVIDER NOTE - CARE PROVIDER_API CALL
Alfred Plaza.  Otolaryngology  [address] [place] [address], [address]  [place] [address]  Phone: [phone]  Fax: [phone]  Follow Up Time:

## 2023-10-29 NOTE — ED ADULT NURSE NOTE - OBJECTIVE STATEMENT
Pt is a 63 year old female from home who presents to ED with c/o left sided neck/facial swelling that pt states started this AM.  Pt on stretcher, alert and oriented x 3.  Pt denies chest pain or SOB.  Pt respirations even and unlabored.  Abdomen soft, non tender.  Skin warm, dry, and appropriate color for age and race.  Pt denies any other complaints at this time.

## 2023-10-29 NOTE — ED PROVIDER NOTE - PATIENT PORTAL LINK FT
You can access the FollowMyHealth Patient Portal offered by [facility] by registering at the following website: [email or web address]. By joining [employer]s FollowMyHealth portal, you will also be able to view your health information using other applications (apps) compatible with our system.

## 2023-10-29 NOTE — ED PROVIDER NOTE - OBJECTIVE STATEMENT
Attending Nuzhat Fry: 63-year-old female that significant past medical to presenting with left-sided facial swelling and pain.  Patient and daughter noticed that the left side of her face began to get swollen and started today and is gotten progressively worse.  No fevers or chills.  Pain did report some pain with chewing.  No history of any dental abscesses in the past.  No hearing changes.  No recent coughs colds or any viral illness.

## 2023-10-30 LAB
EBV EA AB SER IA-ACNC: 42.3 U/ML — HIGH
EBV EA AB SER IA-ACNC: 42.3 U/ML — HIGH
EBV EA AB TITR SER IF: POSITIVE
EBV EA AB TITR SER IF: POSITIVE
EBV EA IGG SER-ACNC: POSITIVE
EBV EA IGG SER-ACNC: POSITIVE
EBV NA IGG SER IA-ACNC: 145 U/ML — HIGH
EBV NA IGG SER IA-ACNC: 145 U/ML — HIGH
EBV PATRN SPEC IB-IMP: SIGNIFICANT CHANGE UP
EBV PATRN SPEC IB-IMP: SIGNIFICANT CHANGE UP
EBV VCA IGG AVIDITY SER QL IA: POSITIVE
EBV VCA IGG AVIDITY SER QL IA: POSITIVE
EBV VCA IGM SER IA-ACNC: 23.8 U/ML — SIGNIFICANT CHANGE UP
EBV VCA IGM SER IA-ACNC: 23.8 U/ML — SIGNIFICANT CHANGE UP
EBV VCA IGM SER IA-ACNC: >750 U/ML — HIGH
EBV VCA IGM SER IA-ACNC: >750 U/ML — HIGH
EBV VCA IGM TITR FLD: NEGATIVE — SIGNIFICANT CHANGE UP
EBV VCA IGM TITR FLD: NEGATIVE — SIGNIFICANT CHANGE UP
T3 SERPL-MCNC: 147 NG/DL — SIGNIFICANT CHANGE UP (ref 80–200)
T3 SERPL-MCNC: 147 NG/DL — SIGNIFICANT CHANGE UP (ref 80–200)
T4 AB SER-ACNC: 11.3 UG/DL — SIGNIFICANT CHANGE UP (ref 4.6–12)
T4 AB SER-ACNC: 11.3 UG/DL — SIGNIFICANT CHANGE UP (ref 4.6–12)
T4 FREE SERPL-MCNC: 1.5 NG/DL — SIGNIFICANT CHANGE UP (ref 0.9–1.8)
T4 FREE SERPL-MCNC: 1.5 NG/DL — SIGNIFICANT CHANGE UP (ref 0.9–1.8)
TSH SERPL-MCNC: 0.01 UIU/ML — LOW (ref 0.27–4.2)
TSH SERPL-MCNC: 0.01 UIU/ML — LOW (ref 0.27–4.2)

## 2023-10-30 NOTE — ED POST DISCHARGE NOTE - DETAILS
10/30: LM on pts VM to call back admin line in regards to results. Sarah Montesinos PA-C 10/31/23:  Desirae, ID # [id number], no answer Left voicemail with instructions to call back the aftercare line.  I also attempted to reach pt daughter, no answer, lvm. -Fritz Ventura PA-C 10/31/23: Pt daughter called back, spoke to pt daughter with pt permission, advised on results and to f/u outpt with PCP, requesting reports be faxed to PCP Dr. Rudolph Salgado [phone]. d/w SYDNEY Blackwood in CDU who will facilitate sending fax. -Fritz Ventura PA-C

## 2023-11-01 LAB
ANA TITR SER: NEGATIVE — SIGNIFICANT CHANGE UP
ANA TITR SER: NEGATIVE — SIGNIFICANT CHANGE UP

## 2024-01-15 NOTE — ED ADULT TRIAGE NOTE - SOURCE OF INFORMATION
Patient
pt received to [facility]orelei mancia and ambulatory at baseline, c/o bite on R arm from unknown insect while in [place] last week, site is swollen, red, painful to touch, purulent discharge noted. pt endorsing throbbing sensation at site and recent low grade fevers, denies chest pain, sob, n/v/d. no acute distress noted at this time. respirations even and nonlabored on room air. comfort and safety maintained. 20G IV placed in R hand. labs drawn and sent. medicated as ordered. pt pending results and dispo

## 2024-01-17 ENCOUNTER — APPOINTMENT (OUTPATIENT)
Dept: OTOLARYNGOLOGY | Facility: CLINIC | Age: 64
End: 2024-01-17